# Patient Record
Sex: FEMALE | Race: WHITE | NOT HISPANIC OR LATINO
[De-identification: names, ages, dates, MRNs, and addresses within clinical notes are randomized per-mention and may not be internally consistent; named-entity substitution may affect disease eponyms.]

---

## 2023-01-18 ENCOUNTER — APPOINTMENT (OUTPATIENT)
Dept: ENDOCRINOLOGY | Facility: CLINIC | Age: 43
End: 2023-01-18
Payer: COMMERCIAL

## 2023-01-18 VITALS
OXYGEN SATURATION: 98 % | HEIGHT: 65 IN | HEART RATE: 107 BPM | SYSTOLIC BLOOD PRESSURE: 116 MMHG | WEIGHT: 210 LBS | DIASTOLIC BLOOD PRESSURE: 66 MMHG | BODY MASS INDEX: 34.99 KG/M2

## 2023-01-18 LAB — GLUCOSE BLDC GLUCOMTR-MCNC: 106

## 2023-01-18 PROCEDURE — 82962 GLUCOSE BLOOD TEST: CPT

## 2023-01-18 PROCEDURE — 99204 OFFICE O/P NEW MOD 45 MIN: CPT | Mod: 25

## 2023-01-18 RX ORDER — LANCETS 33 GAUGE
EACH MISCELLANEOUS
Qty: 400 | Refills: 0 | Status: ACTIVE | COMMUNITY
Start: 2023-01-18 | End: 1900-01-01

## 2023-01-18 RX ORDER — BLOOD SUGAR DIAGNOSTIC
STRIP MISCELLANEOUS
Qty: 400 | Refills: 0 | Status: ACTIVE | COMMUNITY
Start: 2023-01-18 | End: 1900-01-01

## 2023-01-18 NOTE — PHYSICAL EXAM
[Alert] : alert [Normal Sclera/Conjunctiva] : normal sclera/conjunctiva [Normal Hearing] : hearing was normal [No Neck Mass] : no neck mass was observed [Thyroid Not Enlarged] : the thyroid was not enlarged [No Respiratory Distress] : no respiratory distress [Normal S1, S2] : normal S1 and S2 [No Stigmata of Cushings Syndrome] : no stigmata of Cushings Syndrome [Normal Gait] : normal gait [Oriented x3] : oriented to person, place, and time

## 2023-01-18 NOTE — HISTORY OF PRESENT ILLNESS
[FreeTextEntry1] : Diagnosed: GDM\par Onset: 3 Hr GTT \par Family History: Older sister GDM and became T2DM, Older Sister T2DM (obesity related) \par Ethnicity : White \par \par 3 Hour GTT on 1/4/23\par Fasting 97\par 1 Hour 256\par 2 Hour 176\par 3 Hour 59\par \par SMBG: no checking yet\par \par In Office: 106, fasting\par \par Current drug regimen:\par PNV\par \par Weight:Starting Wt: 175\par Current Weight 210, gained 35 pounds \par Diet:"I Like food, snacking a lot"\par B- bagel, egg sandwich \par L- left overs\par D- cook home. rice, beans, veggies, chicken, steak \par Snacks- "Everything" depends on mood, chips, nutella, cereal \par Drink- water, craved soda (pineapple arti)\par Exercise:none\par Smoking: none \par \par OB: AL Clovis\par LMP:6/2022\par QIAN: 3/23/2023\par Weeks: 30.6 weeks\par G 8 P 2\par \par Sonograms:\par 2 weeks ago at [facility]\par not sure weight of baby, as per patient normal measurements \par \par Plan to Breastfeed: yes

## 2023-01-18 NOTE — ASSESSMENT
[FreeTextEntry1] : \par \par \par GDM\par -Start to test glucose 4x's a day, verbalized how to check blood glucose, meter script sent to pharmacy and voucher given \par -Goal for blood glucose levels: in AM Fating <90 and one hour after meals <120\par -Check for urine ketones in the AM\par -Send logs once a week through patient portal\par - Diet (30-60 gm carbohydrates with each meal, 15 grams with snacks. Balance with lean proteins and low fat diet.) Gesti booklet given today \par - Exercise daily as tolerated, work up to 20- 30 minutes a day. Walks after meals \par -.Medication, risks and benefits reviewed. ***Glucose testing and insulin/ medication *** administration for glycemic management \par -GDM Education: Discussed the immediate risks to the mother with GDM are an increased incidence of  delivery (~30%), preeclampsia (~20-30%), and polyhydramnios (~20%) which can result in  labor. The long-term risks to the mother regarding diabetes complications during pregnancy. Counseling on diet, and exercise was given. Fetal/Infant risks discussed and include macrosomia, risk for shoulder dystocia/Erb's palsy, clavicular fractures, fetal distress, low APGAR scores, and even birth asphyxia when unrecognized, respiratory distress syndrome may occur in up to 31% of infants while cardiac septal hypertrophy may be seen in 35-40%., with extremely poor glucose control, there is also an increased risk of fetal mortality due to fetal acidemia and hypoxia. Common metabolic abnormalities in the infant of a diabetic mother include  hypoglycemia, hypocalcemia, hyperbilirubinemia and polycythemia.  hypoglycemia is common in women in suboptimal glycemic control. Also the long-term sequelae of hyperglycemia during pregnancy for offspring and increased risk of adolescent obesity and of Type 2 diabetes. \par -Breastfeeding encouraged. Fetal surveillance as per OB recommendations (fetal ultrasound, fetal movement records, nonstress test, biophysical profile)\par Follow-up in 1 to 2 weeks; call if develop other problems/ questions. Call to report hypoglycemia or sustained hyperglycemia. \par -Lab script given to check HgbA1C\par \par CDE appointment next week \par RTO with NP in 2 weeks\par

## 2023-01-18 NOTE — REVIEW OF SYSTEMS
[Fatigue] : fatigue [Recent Weight Gain (___ Lbs)] : recent weight gain: [unfilled] lbs [Visual Field Defect] : no visual field defect [Blurred Vision] : no blurred vision [Dysphagia] : no dysphagia [Neck Pain] : no neck pain [Chest Pain] : no chest pain [Palpitations] : no palpitations [Nausea] : no nausea [Constipation] : no constipation [Vomiting] : no vomiting [Diarrhea] : no diarrhea [Polyuria] : no polyuria [Polydipsia] : no polydipsia

## 2023-01-23 ENCOUNTER — APPOINTMENT (OUTPATIENT)
Dept: ENDOCRINOLOGY | Facility: CLINIC | Age: 43
End: 2023-01-23
Payer: COMMERCIAL

## 2023-01-23 PROCEDURE — G0108 DIAB MANAGE TRN  PER INDIV: CPT

## 2023-01-23 RX ORDER — BLOOD-GLUCOSE METER
W/DEVICE EACH MISCELLANEOUS
Qty: 1 | Refills: 0 | Status: DISCONTINUED | COMMUNITY
Start: 2023-01-18 | End: 2023-01-23

## 2023-01-24 RX ORDER — BLOOD-GLUCOSE METER
W/DEVICE EACH MISCELLANEOUS
Qty: 1 | Refills: 0 | Status: ACTIVE | COMMUNITY
Start: 2023-01-23 | End: 1900-01-01

## 2023-01-31 LAB — HBA1C MFR BLD HPLC: 6

## 2023-02-01 ENCOUNTER — NON-APPOINTMENT (OUTPATIENT)
Age: 43
End: 2023-02-01

## 2023-02-01 ENCOUNTER — APPOINTMENT (OUTPATIENT)
Dept: ENDOCRINOLOGY | Facility: CLINIC | Age: 43
End: 2023-02-01
Payer: COMMERCIAL

## 2023-02-01 VITALS
SYSTOLIC BLOOD PRESSURE: 110 MMHG | WEIGHT: 217 LBS | OXYGEN SATURATION: 98 % | BODY MASS INDEX: 36.15 KG/M2 | DIASTOLIC BLOOD PRESSURE: 70 MMHG | HEIGHT: 65 IN | HEART RATE: 105 BPM

## 2023-02-01 LAB — GLUCOSE BLDC GLUCOMTR-MCNC: 115

## 2023-02-01 PROCEDURE — 99213 OFFICE O/P EST LOW 20 MIN: CPT | Mod: 25

## 2023-02-01 PROCEDURE — 82962 GLUCOSE BLOOD TEST: CPT

## 2023-02-01 RX ORDER — PEN NEEDLE, DIABETIC 29 G X1/2"
32G X 4 MM NEEDLE, DISPOSABLE MISCELLANEOUS
Qty: 100 | Refills: 0 | Status: DISCONTINUED | COMMUNITY
Start: 2023-02-01 | End: 2023-02-01

## 2023-02-01 RX ORDER — INSULIN HUMAN 100 [IU]/ML
100 INJECTION, SUSPENSION SUBCUTANEOUS
Qty: 1 | Refills: 1 | Status: DISCONTINUED | COMMUNITY
Start: 2023-02-01 | End: 2023-02-01

## 2023-02-01 NOTE — REVIEW OF SYSTEMS
[Fatigue] : fatigue [Recent Weight Gain (___ Lbs)] : recent weight gain: [unfilled] lbs [Visual Field Defect] : no visual field defect [Blurred Vision] : no blurred vision [Dysphagia] : no dysphagia [Neck Pain] : no neck pain [Chest Pain] : no chest pain [Palpitations] : no palpitations [Shortness Of Breath] : no shortness of breath [Nausea] : no nausea [Constipation] : no constipation [Vomiting] : no vomiting [Diarrhea] : no diarrhea [Polyuria] : no polyuria [Polydipsia] : no polydipsia

## 2023-02-01 NOTE — HISTORY OF PRESENT ILLNESS
[FreeTextEntry1] : Diagnosed: GDM\par Onset: 3 Hr GTT \par Family History: Older sister GDM and became T2DM, Older Sister T2DM (obesity related) \par Ethnicity : White \par \par 3 Hour GTT on 23\par Fasting 97\par 1 Hour 256\par 2 Hour 176\par 3 Hour 59\par \par SMB x's a day\par Logs scanned in, not at goal \par Fastin, 115,117,101, 114, 108, 120, 111\par 1 Hour after Breakfast: 145, 112, 167, 156, 138, 146, 126\par 1 Hour After Lunch: 166, 114, 83, 94, 97, 155, 153\par 1 Hour After Dinner: 125, 216, 110, 148, 117, 127, 106, 121\par In Office: 115, ate cheese stick on way here \par \par HgbA1C: 6.0%\par \par Current drug regimen:\par PNV\par \par Weight:Starting Wt: 175, Gained 7 pounds  since last visit \par Current Weight 210, gained 35 pounds \par Diet: Follow GDM diet, has been better, portions are still the issue\par B- bagel, egg sandwich \par L- left overs\par D- cook home. rice, beans, veggies, chicken, steak \par Snacks- english muffin, minimizing fruit, pb and J low sugar \par Drink- water, craved soda (pineapple arti)\par Exercise:none\par Smoking: none \par \par OB: AL Clovis\par LMP:2022\par QIAN: 3/23/2023\par Weeks: 30.6 weeks\par G 8 P 2\par \par Sonograms:\par Has sono tomorrow \par not sure weight of baby, as per patient normal measurements 3.5 pounds \par \par Plan to Breastfeed: yes

## 2023-02-01 NOTE — ASSESSMENT
[FreeTextEntry1] : \par \par \par GDM\par -Continue to test glucose 4x's a day, MUST send in logs\par -Goal for blood glucose levels: in AM Fating <90 and one hour after meals <120\par -Check for urine ketones in the AM\par -Send logs once a week through patient portal\par - Diet (30-60 gm carbohydrates with each meal, 15 grams with snacks. Balance with lean proteins and low fat diet.) \par - Exercise daily as tolerated, work up to 20- 30 minutes a day. Walks after meals \par -Breastfeeding encouraged. Fetal surveillance as per OB recommendations (fetal ultrasound, fetal movement records, nonstress test, biophysical profile)\par -Start Humulin N 6 units QHS, send logs on Monday\par -Pt verbalizes she understands how to inject insulin and was taught at previous CDE appointment \par \par Follow-up in 1 to 2 weeks; call if develop other problems/ questions. Call to report hypoglycemia or sustained hyperglycemia. \par \par \par CDE appointment next week \par RTO with NP in 2 weeks\par

## 2023-02-02 ENCOUNTER — APPOINTMENT (OUTPATIENT)
Dept: ENDOCRINOLOGY | Facility: CLINIC | Age: 43
End: 2023-02-02
Payer: COMMERCIAL

## 2023-02-02 PROCEDURE — 98960 EDU&TRN PT SELF-MGMT NQHP 1: CPT

## 2023-02-09 ENCOUNTER — NON-APPOINTMENT (OUTPATIENT)
Age: 43
End: 2023-02-09

## 2023-02-15 ENCOUNTER — APPOINTMENT (OUTPATIENT)
Dept: ENDOCRINOLOGY | Facility: CLINIC | Age: 43
End: 2023-02-15
Payer: COMMERCIAL

## 2023-02-15 VITALS
SYSTOLIC BLOOD PRESSURE: 110 MMHG | OXYGEN SATURATION: 98 % | HEIGHT: 65 IN | DIASTOLIC BLOOD PRESSURE: 70 MMHG | HEART RATE: 101 BPM | WEIGHT: 218 LBS | BODY MASS INDEX: 36.32 KG/M2

## 2023-02-15 LAB — GLUCOSE BLDC GLUCOMTR-MCNC: 82

## 2023-02-15 PROCEDURE — 82962 GLUCOSE BLOOD TEST: CPT

## 2023-02-15 PROCEDURE — 99213 OFFICE O/P EST LOW 20 MIN: CPT | Mod: 25

## 2023-02-15 NOTE — HISTORY OF PRESENT ILLNESS
[FreeTextEntry1] : Diagnosed: GDM\par Onset: 3 Hr GTT \par Family History: Older sister GDM and became T2DM, Older Sister T2DM (obesity related) \par Ethnicity : White \par \par 3 Hour GTT on 23\par Fasting 97\par 1 Hour 256\par 2 Hour 176\par 3 Hour 59\par \par SMB x's a day\par Logs scanned in, not at goal -23\par Fastin, 86, 101, 100, 101, 118, 102\par 1 Hour after Breakfast: 118, 113, 180, 96, 119, 117, 163\par 1 Hour After Lunch: 112, 128, 108, 96, 122, 105\par 1 Hour After Dinner:109, 151, 111, 157, 105, 242, 134\par \par Pt states that sugars were elevated because she ate wrong foods regular pasta and panera bread \par \par In Office: 82--> ate cold cuts at 11AM-->> FS aafter lunch 126 \par \par Has been very stressed at work \par \par \par HgbA1C: 6.0%\par \par Current drug regimen:\par PNV\par Humulin N 6 units QHS \par \par Weight:Starting Wt: 175, Gained 7 pounds  since last visit \par Current Weight 218, gained 43 pounds \par Diet: Follow GDM diet, has been better, portions are still the issue\par B- bagel, egg sandwich \par L- left overs\par D- cook home. rice, beans, veggies, chicken, steak \par Snacks- english muffin, minimizing fruit, pb and J low sugar \par Drink- water, craved soda (pineapple arti)\par Exercise:none\par Smoking: none \par \par OB: AL Clovis\par LMP:2022\par QIAN: 3/23/2023\par Weeks: 35 weeks\par G 8 P 2\par \par Sonograms:\par 25 5 pounds \par Next sono is 3/3/2023\par \par Plan to Breastfeed: yes

## 2023-02-15 NOTE — ASSESSMENT
[Importance of Diet and Exercise] : importance of diet and exercise to improve glycemic control, achieve weight loss and improve cardiovascular health [Exercise/Effect on Glucose] : exercise/effect on glucose [Action and use of Insulin] : action and use of short and long-acting insulin [FreeTextEntry1] : \par \par \par GDM\par -Continue to test glucose 4x's a day, MUST send in logs\par -Goal for blood glucose levels: in AM Fating <90 and one hour after meals <120\par -Check for urine ketones in the AM\par -Send logs once a week through patient portal\par - Diet (30-60 gm carbohydrates with each meal, 15 grams with snacks. Balance with lean proteins and low fat diet.) \par - Exercise daily as tolerated, work up to 20- 30 minutes a day. Walks after meals \par -Breastfeeding encouraged. Fetal surveillance as per OB recommendations (fetal ultrasound, fetal movement records, nonstress test, biophysical profile)\par -Increase Humulin N 10 units QHS, and Humalog 4 units TID AC send logs on Monday\par -Pt verbalizes she understands how to inject insulin, + rotating sites \par \par Follow-up in 2 weeks; call if develop other problems/ questions. Call to report hypoglycemia or sustained hyperglycemia. \par \par \par CDE appointment in 2 weeks \par RTO with NP in 2 weeks\par

## 2023-02-15 NOTE — REVIEW OF SYSTEMS
[Fatigue] : fatigue [Recent Weight Gain (___ Lbs)] : recent weight gain: [unfilled] lbs [Visual Field Defect] : no visual field defect [Dysphagia] : no dysphagia [Neck Pain] : no neck pain [Chest Pain] : no chest pain [Palpitations] : no palpitations [Shortness Of Breath] : no shortness of breath [Nausea] : no nausea [Constipation] : no constipation [Vomiting] : no vomiting [Diarrhea] : no diarrhea [Polyuria] : no polyuria [Polydipsia] : no polydipsia

## 2023-02-23 ENCOUNTER — NON-APPOINTMENT (OUTPATIENT)
Age: 43
End: 2023-02-23

## 2023-02-27 ENCOUNTER — APPOINTMENT (OUTPATIENT)
Dept: ENDOCRINOLOGY | Facility: CLINIC | Age: 43
End: 2023-02-27
Payer: COMMERCIAL

## 2023-02-27 PROCEDURE — G0108 DIAB MANAGE TRN  PER INDIV: CPT

## 2023-03-06 ENCOUNTER — NON-APPOINTMENT (OUTPATIENT)
Age: 43
End: 2023-03-06

## 2023-03-15 ENCOUNTER — APPOINTMENT (OUTPATIENT)
Dept: ENDOCRINOLOGY | Facility: CLINIC | Age: 43
End: 2023-03-15
Payer: COMMERCIAL

## 2023-03-15 VITALS
SYSTOLIC BLOOD PRESSURE: 108 MMHG | HEIGHT: 65 IN | HEART RATE: 92 BPM | WEIGHT: 216 LBS | DIASTOLIC BLOOD PRESSURE: 64 MMHG | OXYGEN SATURATION: 98 % | BODY MASS INDEX: 35.99 KG/M2

## 2023-03-15 LAB — GLUCOSE BLDC GLUCOMTR-MCNC: 60

## 2023-03-15 PROCEDURE — 82962 GLUCOSE BLOOD TEST: CPT

## 2023-03-15 PROCEDURE — 99213 OFFICE O/P EST LOW 20 MIN: CPT | Mod: 25

## 2023-03-15 NOTE — REVIEW OF SYSTEMS
[Fatigue] : fatigue [Nausea] : nausea [Recent Weight Gain (___ Lbs)] : no recent weight gain [Recent Weight Loss (___ Lbs)] : no recent weight loss [Visual Field Defect] : no visual field defect [Blurred Vision] : no blurred vision [Dysphagia] : no dysphagia [Neck Pain] : no neck pain [Chest Pain] : no chest pain [Palpitations] : no palpitations [Shortness Of Breath] : no shortness of breath [Constipation] : no constipation [Vomiting] : no vomiting [Diarrhea] : no diarrhea [Polyuria] : no polyuria [Polydipsia] : no polydipsia

## 2023-03-15 NOTE — ASSESSMENT
[FreeTextEntry1] : \par \par \par GDM\par -Continue to test glucose 4x's a day, MUST send in logs again on Mondau \par -Goal for blood glucose levels: in AM Fating <90 and one hour after meals <120\par -Check for urine ketones in the AM\par - Diet (30-60 gm carbohydrates with each meal, 15 grams with snacks. Balance with lean proteins and low fat diet.) \par - Exercise daily as tolerated, work up to 20- 30 minutes a day. Walks after meals \par -Breastfeeding encouraged. Fetal surveillance as per OB recommendations (fetal ultrasound, fetal movement records, nonstress test, biophysical profile)\par -Continue Humulin N 18 units QHS, and stop humalog since she is barley eating and want to prevent hypoglycemic events\par -Pt verbalizes she understands how to inject insulin, + rotating sites \par -Call OB or go to ER if she does not feel baby moving and/or continue to not be able to eat or drink \par -Following up with OB tomorrow \par \par \par RTO with NP in 1 weeks\par

## 2023-03-15 NOTE — HISTORY OF PRESENT ILLNESS
[FreeTextEntry1] : Diagnosed: GDM\par Onset: 3 Hr GTT \par Family History: Older sister GDM and became T2DM, Older Sister T2DM (obesity related) \par Ethnicity : White \par \par 3 Hour GTT on 23\par Fasting 97\par 1 Hour 256\par 2 Hour 176\par 3 Hour 59\par \par SMB x's a day\par Logs scanned in,3/8/23-3/14/23\par Fastin, 92, 102, 87, 90, 104, 97\par 1 Hour after Breakfast: 109, 124, 79, 89, 112\par 1 Hour After Lunch: 98, 87, 75, 78,107,61\par 1 Hour After Dinner:82, 78, 87, 82, 69, 92, 80\par \par \par In Office: 60--> ate keto protein bar an hour ago, gave apple juice today \par \par Has not been able to eat much or drink. Food has been making her nauseas since  \par \par HgbA1C: 6.0%\par \par Current drug regimen:\par PNV\par Humulin N 18 units QHS \par Humalog 4 units TID AC\par \par Weight:Starting Wt: 175, +36 pounds \par Current Weight 218, gained 43 pounds \par Diet: Follow GDM diet, has been better, portions are still the issue\par Has been unable to eat or drink much since , she feel nauseas after she eats \par \par Exercise:none\par Smoking: none \par \par OB: AL Clovis\par LMP:2022\par QIAN: 3/23/2023\par Weeks: 39 weeks tomorrow, Getting induced on 3/22/23\par G 8 P 2\par \par Sonograms:\par Last sono  3/3/2023\par Measuring \par Plan to Breastfeed: yes \par \par Went to hospital on [facility], because  she was having contraction, nauseas, could not eat or drink anything. She was dehydrated. She feels like she can not eat, she gets very nauseas.

## 2023-03-21 ENCOUNTER — APPOINTMENT (OUTPATIENT)
Dept: ENDOCRINOLOGY | Facility: CLINIC | Age: 43
End: 2023-03-21

## 2023-04-05 ENCOUNTER — RX RENEWAL (OUTPATIENT)
Age: 43
End: 2023-04-05

## 2023-05-23 ENCOUNTER — APPOINTMENT (OUTPATIENT)
Dept: ENDOCRINOLOGY | Facility: CLINIC | Age: 43
End: 2023-05-23
Payer: COMMERCIAL

## 2023-05-23 DIAGNOSIS — O24.419 GESTATIONAL DIABETES MELLITUS IN PREGNANCY, UNSPECIFIED CONTROL: ICD-10-CM

## 2023-05-23 PROCEDURE — 99213 OFFICE O/P EST LOW 20 MIN: CPT | Mod: 95

## 2023-05-23 RX ORDER — SYRINGE AND NEEDLE,INSULIN,1ML 28GX1/2"
31G X 5/16" SYRINGE, EMPTY DISPOSABLE MISCELLANEOUS
Qty: 400 | Refills: 0 | Status: DISCONTINUED | COMMUNITY
Start: 2023-02-01 | End: 2023-05-23

## 2023-05-23 RX ORDER — INSULIN LISPRO 100 [IU]/ML
100 INJECTION, SOLUTION INTRAVENOUS; SUBCUTANEOUS
Qty: 1 | Refills: 2 | Status: DISCONTINUED | COMMUNITY
Start: 2023-02-15 | End: 2023-05-23

## 2023-05-23 RX ORDER — INSULIN HUMAN 100 [IU]/ML
100 INJECTION, SUSPENSION SUBCUTANEOUS
Qty: 6 | Refills: 1 | Status: DISCONTINUED | COMMUNITY
Start: 2023-02-01 | End: 2023-05-23

## 2023-05-23 RX ORDER — URINE ACETONE TEST STRIPS
STRIP MISCELLANEOUS
Qty: 1 | Refills: 1 | Status: DISCONTINUED | COMMUNITY
Start: 2023-01-23 | End: 2023-05-23

## 2023-05-23 NOTE — ASSESSMENT
[FreeTextEntry1] : GDM\par Now post partum\par -Will send for labs, repeat 2 Hour GTT, check tft's and HgbA1C\par -Will call patient with results

## 2023-05-23 NOTE — HISTORY OF PRESENT ILLNESS
[Home] : at home, [unfilled] , at the time of the visit. [Medical Office: ([facility])___] : at the medical office located in  [Verbal consent obtained from patient] : the patient, [unfilled] [FreeTextEntry1] : OB: AL Clovis\par G 8 P 3\par Deleivered Baby girl 3/22/23, , 8 pounds 5 ounces \par No maternal complications\par Baby went to  nursery \par \par SMBG\par randomly have check sugars sugars in 90s \par \par \par Weight: now 210\par Diet: watching her diet \par Breast and formula feeding \par \par \par \par